# Patient Record
Sex: FEMALE | Race: BLACK OR AFRICAN AMERICAN | NOT HISPANIC OR LATINO | URBAN - METROPOLITAN AREA
[De-identification: names, ages, dates, MRNs, and addresses within clinical notes are randomized per-mention and may not be internally consistent; named-entity substitution may affect disease eponyms.]

---

## 2017-11-11 ENCOUNTER — OUTPATIENT (OUTPATIENT)
Dept: OUTPATIENT SERVICES | Facility: HOSPITAL | Age: 42
LOS: 1 days | End: 2017-11-11
Payer: COMMERCIAL

## 2017-11-11 ENCOUNTER — APPOINTMENT (OUTPATIENT)
Dept: MAMMOGRAPHY | Facility: CLINIC | Age: 42
End: 2017-11-11
Payer: COMMERCIAL

## 2017-11-11 DIAGNOSIS — Z12.31 ENCOUNTER FOR SCREENING MAMMOGRAM FOR MALIGNANT NEOPLASM OF BREAST: ICD-10-CM

## 2017-11-11 PROCEDURE — 77067 SCR MAMMO BI INCL CAD: CPT

## 2017-11-11 PROCEDURE — 77063 BREAST TOMOSYNTHESIS BI: CPT | Mod: 26

## 2017-11-11 PROCEDURE — G0202: CPT | Mod: 26

## 2017-11-11 PROCEDURE — 77063 BREAST TOMOSYNTHESIS BI: CPT

## 2017-11-24 ENCOUNTER — OUTPATIENT (OUTPATIENT)
Dept: OUTPATIENT SERVICES | Facility: HOSPITAL | Age: 42
LOS: 1 days | End: 2017-11-24
Payer: COMMERCIAL

## 2017-11-24 ENCOUNTER — APPOINTMENT (OUTPATIENT)
Dept: ULTRASOUND IMAGING | Facility: CLINIC | Age: 42
End: 2017-11-24
Payer: COMMERCIAL

## 2017-11-24 DIAGNOSIS — Z00.8 ENCOUNTER FOR OTHER GENERAL EXAMINATION: ICD-10-CM

## 2017-11-24 PROCEDURE — 76642 ULTRASOUND BREAST LIMITED: CPT | Mod: 26,50

## 2017-11-24 PROCEDURE — 76642 ULTRASOUND BREAST LIMITED: CPT

## 2018-08-29 ENCOUNTER — TRANSCRIPTION ENCOUNTER (OUTPATIENT)
Age: 43
End: 2018-08-29

## 2020-04-25 ENCOUNTER — MESSAGE (OUTPATIENT)
Age: 45
End: 2020-04-25

## 2020-05-13 ENCOUNTER — APPOINTMENT (OUTPATIENT)
Dept: DISASTER EMERGENCY | Facility: CLINIC | Age: 45
End: 2020-05-13

## 2020-08-01 ENCOUNTER — RESULT REVIEW (OUTPATIENT)
Age: 45
End: 2020-08-01

## 2021-01-11 ENCOUNTER — TRANSCRIPTION ENCOUNTER (OUTPATIENT)
Age: 46
End: 2021-01-11

## 2021-01-29 ENCOUNTER — APPOINTMENT (OUTPATIENT)
Dept: PEDIATRIC MEDICAL GENETICS | Facility: CLINIC | Age: 46
End: 2021-01-29
Payer: MEDICAID

## 2021-01-29 PROCEDURE — 99203 OFFICE O/P NEW LOW 30 MIN: CPT | Mod: 95

## 2021-03-09 NOTE — CONSULT LETTER
[Dear  ___] : Dear  [unfilled], [Consult Letter:] : I had the pleasure of evaluating your patient, [unfilled]. [Please see my note below.] : Please see my note below. [Sincerely,] : Sincerely, [FreeTextEntry3] : Joe Hudson MD\par

## 2021-03-12 ENCOUNTER — APPOINTMENT (OUTPATIENT)
Dept: PEDIATRIC MEDICAL GENETICS | Facility: CLINIC | Age: 46
End: 2021-03-12
Payer: MEDICAID

## 2021-03-12 PROCEDURE — 99212 OFFICE O/P EST SF 10 MIN: CPT | Mod: 95

## 2022-08-03 DIAGNOSIS — Z01.812 ENCOUNTER FOR PREPROCEDURAL LABORATORY EXAMINATION: ICD-10-CM

## 2022-08-03 DIAGNOSIS — Z20.822 ENCOUNTER FOR PREPROCEDURAL LABORATORY EXAMINATION: ICD-10-CM

## 2022-08-09 LAB — SARS-COV-2 N GENE NPH QL NAA+PROBE: NOT DETECTED

## 2022-08-11 ENCOUNTER — APPOINTMENT (OUTPATIENT)
Dept: PULMONOLOGY | Facility: CLINIC | Age: 47
End: 2022-08-11

## 2022-08-11 VITALS
HEIGHT: 67.32 IN | DIASTOLIC BLOOD PRESSURE: 74 MMHG | BODY MASS INDEX: 39.71 KG/M2 | SYSTOLIC BLOOD PRESSURE: 120 MMHG | TEMPERATURE: 97.8 F | HEART RATE: 70 BPM | WEIGHT: 256 LBS

## 2022-08-11 DIAGNOSIS — R06.83 SNORING: ICD-10-CM

## 2022-08-11 DIAGNOSIS — K21.9 GASTRO-ESOPHAGEAL REFLUX DISEASE W/OUT ESOPHAGITIS: ICD-10-CM

## 2022-08-11 PROCEDURE — 94010 BREATHING CAPACITY TEST: CPT

## 2022-08-11 PROCEDURE — ZZZZZ: CPT

## 2022-08-11 PROCEDURE — 99204 OFFICE O/P NEW MOD 45 MIN: CPT | Mod: 25

## 2022-08-11 PROCEDURE — 94726 PLETHYSMOGRAPHY LUNG VOLUMES: CPT

## 2022-08-11 PROCEDURE — 94729 DIFFUSING CAPACITY: CPT

## 2022-08-12 NOTE — PHYSICAL EXAM
[No Acute Distress] : no acute distress [Well Nourished] : well nourished [Well Groomed] : well groomed [No Deformities] : no deformities [Well Developed] : well developed [Normal Oropharynx] : normal oropharynx [Normal Appearance] : normal appearance [No Neck Mass] : no neck mass [Normal Rate/Rhythm] : normal rate/rhythm [Normal S1, S2] : normal s1, s2 [No Resp Distress] : no resp distress [No Acc Muscle Use] : no acc muscle use [Clear to Auscultation Bilaterally] : clear to auscultation bilaterally [No Abnormalities] : no abnormalities [Benign] : benign [Soft] : soft [Normal Gait] : normal gait [No Clubbing] : no clubbing [No Cyanosis] : no cyanosis [No Edema] : no edema [FROM] : FROM [Normal Color/ Pigmentation] : normal color/ pigmentation [No Focal Deficits] : no focal deficits [No Motor Deficits] : no motor deficits [Oriented x3] : oriented x3 [Normal Mood] : normal mood [Normal Affect] : normal affect [TextBox_11] : NCAT, EOMI, anicteric, MMM, nares clear

## 2022-08-12 NOTE — CONSULT LETTER
[Dear  ___] : Dear  [unfilled], [Consult Letter:] : I had the pleasure of evaluating your patient, [unfilled]. [Please see my note below.] : Please see my note below. [Sincerely,] : Sincerely, [FreeTextEntry3] : Silver Fontanez MD, FACP, FCCP\par Division of Pulmonary, Critical Care, and Sleep Medicine\par Associate Professor of Medicine \par [employer] at [facility]\par

## 2022-08-12 NOTE — ASSESSMENT
[FreeTextEntry1] : 46F GERD, HTN, obesity, and anemia presenting for initial pulmonary evaluation of intermittent shortness of breath symptoms. These symptoms occur about once a day and are described as a difficulty taking a deep breath in. She has not had any limitation to her daily activity related to these symptoms.\par \par 1. Shortness of breath - of unclear etiology\par - PFTs without evidence of obstructive or restrictive defect\par - Have recommended CXR and ECHO for further evaluation. While lung exam is clear and no hypoxemia patient has had symptoms for > 1 year and was previously on diet pills\par - Increase physical activity\par - Deep breathing exercises\par - Continued weight loss will be beneficial as well\par \par 2. Snoring - patient has signs and symptoms of sleep disorder breathing\par - I have referred patient for a diagnostic HST for further evaluation. We discussed potential treatment options which will be dependent on results\par \par 3. Gastro - patient with a reported history of iron deficiency anemia - she had a colonoscopy at [facility] in 2021 but I do not have these results for review\par - No longer on iron supplementation as per her primary physicians\par - Avoidance of GERD inducing foods - GERD management - reinitiate PPI as needed\par \par 4. Health Maintenance\par Spirometry: Reviewed\par Smoking status: Never smoker\par Pneumococcal vaccination status: N/A\par Fate Sleepiness Scale: 2 (8/11/22)\par \par 5. Followup after further testing. \par - She will call me with any questions or concerns.\par \par \par The above plan was discussed with CHANTE WEINBERG  in detail. Patient verbalized understanding and agrees with plan as detailed above. Patient was provided education and counselling on current diagnosis/symptoms, diagnostic work up, treatment options and potential side effects of any prescribed therapy/therapies. CHANTE  was advised to call our clinic at [phone] for any new or worsening symptoms, or with any questions or concerns. In case of acute onset of respiratory symptoms or worsening presentation, patient was advised to present to nearest emergency room for further evaluation. CHANTE  expressed understanding and all questions/concerns were addressed.\par

## 2022-08-12 NOTE — HISTORY OF PRESENT ILLNESS
[Never] : never [TextBox_4] : 46F nonsmoker, HTN, GERD, obesity presenting for initial pulmonary evaluation of shortness of breath. She has had these symptoms for over 1 year but has been trying to live with them.\par \par She describes these SOB episodes as short lasting and without chest pain or heaviness. She describes it as a difficulty taking a deep breath in and then feeling the need to yawn to force a breath. They come at random times through the day and occur 1-2 times per day. There are some weeks where they occur daily and other weeks where she may have no symptoms for a while. She has not noted any inciting or exacerbating symptoms. She has not had any lung imaging.\par \par - Medication: HCTZ\par - Allergy: Sulfa\par - Works as [employer] - no exposures\par - Enjoys traveling - went to [place] at end of last year with a group of friends\par \par About 1 year ago she was taking some weight loss medication (unknown which) and she had SOB and palpitations that were bad enough to lead her to go to the ED. After that she has been following with a PMD. It was initially thought that anemia may have been playing a part in this and she was previously on iron supplementation but has not required this for the last few months. She is unsure her CBC/Hb.\par \par She has no prior history of lung disease. She denies any history of asthma, COPD, or bacterial pneumonias. She does not have any known cardiovascular disease other than HTN. She denies any history of heart failure. She has a history of gallstones and GERD.\par \par She does reportedly snore. She denies apneic episodes. She has mild daytime fatigue. Her ESS was 2 on initial visit. \par \par She has an extensive family history of cancer. She is a never smoker. She had a brother who  of lung cancer at age 62 but he was a heavy smoker.\par \par \par PFTs: 22 - FEV1 2.51L (91%), FVC 2.92L (88%), FEV1/FVC 86%, FEF 25-75 99%, TLC 87%, ERV 25%, RV/TLC 31%, DLCO 78% [ESS] : 2

## 2022-08-12 NOTE — REVIEW OF SYSTEMS
[GERD] : gerd [Anemia] : anemia [Obesity] : obesity [Fever] : no fever [Fatigue] : no fatigue [EDS] : no EDS [Chills] : no chills [Poor Appetite] : no poor appetite [Dry Eyes] : no dry eyes [Sore Throat] : no sore throat [Nasal Congestion] : no nasal congestion [Postnasal Drip] : no postnasal drip [Dry Mouth] : no dry mouth [Cough] : no cough [Hemoptysis] : no hemoptysis [Frequent URIs] : no frequent URIs [Sputum] : no sputum [Dyspnea] : no dyspnea [Wheezing] : no wheezing [SOB on Exertion] : no sob on exertion [Chest Discomfort] : no chest discomfort [Claudication] : no claudication [Edema] : no edema [Leg Cramps] : no leg cramps [Orthopnea] : no orthopnea [Watery Eyes] : no watery eyes [Itchy Eyes] : no itchy eyes [Abdominal Pain] : no abdominal pain [Nausea] : no nausea [Nocturia] : no nocturia [Arthralgias] : no arthralgias [Myalgias] : no myalgias [Rash] : no rash [Headache] : no headache [Focal Weakness] : no focal weakness [Head Injury] : no head injury [Depression] : no depression [Anxiety] : no anxiety [TextBox_30] : unclear difficulty taking a deep breath in that forces her to yawn - occurs about once daily on most days

## 2022-09-10 ENCOUNTER — OUTPATIENT (OUTPATIENT)
Dept: OUTPATIENT SERVICES | Facility: HOSPITAL | Age: 47
LOS: 1 days | End: 2022-09-10
Payer: MEDICAID

## 2022-09-10 ENCOUNTER — APPOINTMENT (OUTPATIENT)
Dept: RADIOLOGY | Facility: CLINIC | Age: 47
End: 2022-09-10

## 2022-09-10 DIAGNOSIS — R06.02 SHORTNESS OF BREATH: ICD-10-CM

## 2022-09-10 DIAGNOSIS — Z00.8 ENCOUNTER FOR OTHER GENERAL EXAMINATION: ICD-10-CM

## 2022-09-10 PROCEDURE — 71046 X-RAY EXAM CHEST 2 VIEWS: CPT | Mod: 26

## 2022-09-10 PROCEDURE — 71046 X-RAY EXAM CHEST 2 VIEWS: CPT

## 2022-10-10 ENCOUNTER — APPOINTMENT (OUTPATIENT)
Dept: SLEEP CENTER | Facility: CLINIC | Age: 47
End: 2022-10-10

## 2022-10-19 ENCOUNTER — APPOINTMENT (OUTPATIENT)
Dept: SLEEP CENTER | Facility: CLINIC | Age: 47
End: 2022-10-19

## 2022-10-19 ENCOUNTER — OUTPATIENT (OUTPATIENT)
Dept: OUTPATIENT SERVICES | Facility: HOSPITAL | Age: 47
LOS: 1 days | End: 2022-10-19
Payer: MEDICAID

## 2022-10-19 PROCEDURE — 95810 POLYSOM 6/> YRS 4/> PARAM: CPT

## 2022-10-19 PROCEDURE — 95810 POLYSOM 6/> YRS 4/> PARAM: CPT | Mod: 26

## 2022-11-22 DIAGNOSIS — G47.33 OBSTRUCTIVE SLEEP APNEA (ADULT) (PEDIATRIC): ICD-10-CM

## 2022-11-28 ENCOUNTER — APPOINTMENT (OUTPATIENT)
Dept: PULMONOLOGY | Facility: CLINIC | Age: 47
End: 2022-11-28

## 2022-11-28 VITALS
SYSTOLIC BLOOD PRESSURE: 131 MMHG | RESPIRATION RATE: 15 BRPM | HEART RATE: 72 BPM | OXYGEN SATURATION: 98 % | DIASTOLIC BLOOD PRESSURE: 84 MMHG | TEMPERATURE: 97 F | WEIGHT: 256 LBS | BODY MASS INDEX: 38.8 KG/M2 | HEIGHT: 68 IN

## 2022-11-28 DIAGNOSIS — Z00.00 ENCOUNTER FOR GENERAL ADULT MEDICAL EXAMINATION W/OUT ABNORMAL FINDINGS: ICD-10-CM

## 2022-11-28 DIAGNOSIS — R06.02 SHORTNESS OF BREATH: ICD-10-CM

## 2022-11-28 DIAGNOSIS — E66.9 OBESITY, UNSPECIFIED: ICD-10-CM

## 2022-11-28 DIAGNOSIS — G47.33 OBSTRUCTIVE SLEEP APNEA (ADULT) (PEDIATRIC): ICD-10-CM

## 2022-11-28 PROCEDURE — 99213 OFFICE O/P EST LOW 20 MIN: CPT

## 2022-11-28 NOTE — REVIEW OF SYSTEMS
[GERD] : gerd [Arthralgias] : arthralgias [Anemia] : anemia [Obesity] : obesity [Fever] : no fever [Fatigue] : no fatigue [EDS] : no EDS [Chills] : no chills [Poor Appetite] : no poor appetite [Dry Eyes] : no dry eyes [Sore Throat] : no sore throat [Nasal Congestion] : no nasal congestion [Postnasal Drip] : no postnasal drip [Dry Mouth] : no dry mouth [Cough] : no cough [Hemoptysis] : no hemoptysis [Frequent URIs] : no frequent URIs [Sputum] : no sputum [Dyspnea] : no dyspnea [Wheezing] : no wheezing [SOB on Exertion] : no sob on exertion [Chest Discomfort] : no chest discomfort [Claudication] : no claudication [Edema] : no edema [Leg Cramps] : no leg cramps [Orthopnea] : no orthopnea [Watery Eyes] : no watery eyes [Itchy Eyes] : no itchy eyes [Abdominal Pain] : no abdominal pain [Nausea] : no nausea [Nocturia] : no nocturia [Myalgias] : no myalgias [Rash] : no rash [Headache] : no headache [Focal Weakness] : no focal weakness [Head Injury] : no head injury [Depression] : no depression [Anxiety] : no anxiety [TextBox_94] : knee pain

## 2022-11-28 NOTE — PHYSICAL EXAM
[No Acute Distress] : no acute distress [Well Nourished] : well nourished [Well Groomed] : well groomed [No Deformities] : no deformities [Well Developed] : well developed [Normal Oropharynx] : normal oropharynx [II] : Mallampati Class: II [Normal Appearance] : normal appearance [No Neck Mass] : no neck mass [Normal Rate/Rhythm] : normal rate/rhythm [Normal S1, S2] : normal s1, s2 [No Resp Distress] : no resp distress [No Acc Muscle Use] : no acc muscle use [Clear to Auscultation Bilaterally] : clear to auscultation bilaterally [No Abnormalities] : no abnormalities [Benign] : benign [Soft] : soft [Normal Gait] : normal gait [No Clubbing] : no clubbing [No Cyanosis] : no cyanosis [No Edema] : no edema [FROM] : FROM [Normal Color/ Pigmentation] : normal color/ pigmentation [No Focal Deficits] : no focal deficits [No Motor Deficits] : no motor deficits [Oriented x3] : oriented x3 [Normal Mood] : normal mood [Normal Affect] : normal affect [TextBox_11] : NCAT, EOMI, anicteric, MMM, nares clear, no thrush

## 2022-11-28 NOTE — HISTORY OF PRESENT ILLNESS
[Never] : never [Home] : home [TextBox_4] : 47F nonsmoker, HTN, GERD, obesity presenting for followup pulmonary evaluation of shortness of breath.\par \par - No longer describes SOB symptoms - and overall doing well\par - CXR 9/10/2022 reviewed with patient and clear\par - Had HST - which reveals mild SAGRARIO but moderate in REM and while supine - denies sleep related symptoms today\par - Recently drove to [place] for a death in the family. Lives with her sister. 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 She describes it as a difficulty taking a deep breath in and then feeling the need to yawn to force a breath. They come at random times through the day and occur 1-2 times per day. There are some weeks where they occur daily and other weeks where she may have no symptoms for a while. She has not noted any inciting or exacerbating symptoms. She has not had any lung imaging.\par \par About 1 year ago she was taking some weight loss medication (unknown which) and she had SOB and palpitations that were bad enough to lead her to go to the ED. After that she has been following with a PMD. It was initially thought that anemia may have been playing a part in this and she was previously on iron supplementation but has not required this for the last few months. She is unsure her CBC/Hb.\par \par She has no prior history of lung disease. She denies any history of asthma, COPD, or bacterial pneumonias. She does not have any known cardiovascular disease other than HTN. She denies any history of heart failure. She has a history of gallstones and GERD.\par \par She does reportedly snore. She denies apneic episodes. She has mild daytime fatigue. Her ESS was 2 on initial visit. \par \par She has an extensive family history of cancer. She is a never smoker. She had a brother who  of lung cancer at age 62 but he was a heavy smoker. [TextBox_100] : 10/19/22 [TextBox_108] : 4.0 (RDI 5.4) - REM AHI 24.6 (RDI 26.2) [TextBox_112] : 7.6 [TextBox_116] : 55 [TextBox_120] : Disordered breathing events occurred at moderate frequency during REM sleep and at a severe frequency during supine REM sleep [ESS] : 2 [TextBox_57] : CXR 9/10/22 - IMPRESSION: Negative radiographs

## 2022-11-28 NOTE — ASSESSMENT
[FreeTextEntry1] : 47F obesity and mild SAGRARIO, GERD, HTN, and anemia presenting for followup pulmonary evaluation. Since her initial visit her SOB symptoms have resolved.\par \par 1. Shortness of breath - presently resolved\par - PFTs without evidence of obstructive or restrictive defect\par - CXR noted 9/2022 - clear\par - Await report of echocardiogram - given history of diet pill use \par - Increase physical activity as able based on Ortho followup - to start PT\par - Deep breathing exercises\par \par 2. Mild SAGRARIO and snoring - patient presently opting to monitor off therapy\par - HST reviewed with patient has overall mild SAGRARIO (RDI 5.4) but moderate to severe SAGRARIO during REM sleep. We discussed potential treatment options including APAP, PAP titration, and formal sleep evaluation. Patient prefers to avoid therapy at this time given she feels well\par - She will call me if she has sleep symptoms and we will re-discuss therapy at that time\par - Positional sleep discussed\par - Continued weight loss will be beneficial as well\par \par 3. Health Maintenance\par Spirometry: Reviewed\par Smoking status: Never smoker\par Pneumococcal vaccination status: N/A\par Hamel Sleepiness Scale: 2 (8/11/22)\par \par 4. Followup as needed\par - All questions answered\par \par \par The above plan was discussed with CHANTE WEINBERG  in detail. Patient verbalized understanding and agrees with plan as detailed above. Patient was provided education and counselling on current diagnosis/symptoms, diagnostic work up, treatment options and potential side effects of any prescribed therapy/therapies. CHANTE  was advised to call our clinic at [phone] for any new or worsening symptoms, or with any questions or concerns. In case of acute onset of respiratory symptoms or worsening presentation, patient was advised to present to nearest emergency room for further evaluation. CHANTE  expressed understanding and all questions/concerns were addressed.\par